# Patient Record
Sex: FEMALE | Race: WHITE | URBAN - METROPOLITAN AREA
[De-identification: names, ages, dates, MRNs, and addresses within clinical notes are randomized per-mention and may not be internally consistent; named-entity substitution may affect disease eponyms.]

---

## 2017-10-09 ENCOUNTER — HOSPITAL ENCOUNTER (OUTPATIENT)
Dept: PSYCHIATRY | Age: 22
Discharge: HOME | End: 2017-10-31
Admitting: PSYCHIATRY & NEUROLOGY
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
## 2017-10-10 ENCOUNTER — HOSPITAL ENCOUNTER (OUTPATIENT)
Dept: PSYCHIATRY | Age: 22
Discharge: HOME | End: 2017-10-10
Admitting: PSYCHIATRY & NEUROLOGY

## 2017-10-10 DIAGNOSIS — F43.10 PTSD (POST-TRAUMATIC STRESS DISORDER): ICD-10-CM

## 2017-10-10 DIAGNOSIS — F33.2 SEVERE EPISODE OF RECURRENT MAJOR DEPRESSIVE DISORDER, WITHOUT PSYCHOTIC FEATURES (HCC): ICD-10-CM

## 2017-10-10 PROCEDURE — 99219 PR INITIAL OBSERVATION CARE/DAY 50 MINUTES: CPT | Performed by: PHYSICIAN ASSISTANT

## 2017-10-10 RX ORDER — BUSPIRONE HYDROCHLORIDE 7.5 MG/1
7.5 TABLET ORAL 3 TIMES DAILY
Qty: 42 TABLET | Refills: 0 | Status: SHIPPED | OUTPATIENT
Start: 2017-10-10 | End: 2017-10-10

## 2017-10-10 RX ORDER — BUSPIRONE HYDROCHLORIDE 7.5 MG/1
7.5 TABLET ORAL 3 TIMES DAILY
Qty: 42 TABLET | Refills: 0 | Status: SHIPPED | OUTPATIENT
Start: 2017-10-10 | End: 2017-10-23

## 2017-10-10 RX ORDER — SERTRALINE HYDROCHLORIDE 100 MG/1
150 TABLET, FILM COATED ORAL DAILY
Qty: 1 TABLET | Refills: 0
Start: 2017-10-10 | End: 2017-10-17 | Stop reason: DRUGHIGH

## 2017-10-10 RX ORDER — HYDROXYZINE PAMOATE 25 MG/1
25 CAPSULE ORAL 2 TIMES DAILY PRN
Qty: 28 CAPSULE | Refills: 0 | Status: SHIPPED | OUTPATIENT
Start: 2017-10-10 | End: 2017-10-23

## 2017-10-10 ASSESSMENT — ENCOUNTER SYMPTOMS
DOUBLE VISION: 0
NAUSEA: 0
BLURRED VISION: 0
VOMITING: 0
DIARRHEA: 0
CONSTIPATION: 0
SHORTNESS OF BREATH: 0

## 2017-10-10 NOTE — PLAN OF CARE
Problem: Altered Mood, Depressive Behavior  Goal: LTG-Able to verbalize acceptance of life and situations over which he or she has no control                                                                      Group Therapy Note    Date: 10/10/2017  Start Time: 8:30  End Time:  9:45  Number of Participants: 6    Type of Group: Psychotherapy    Patient's Goal:  PT will improve interpersonal interactions through group processing and agenda setting. Notes:  Pt participated in group discussion minimally, provided supportive feedback, and addressed her agenda within the group. Status After Intervention:  Unchanged    Participation Level:  Active Listener and Minimal    Participation Quality: Appropriate, Attentive, Sharing and Supportive      Speech:  normal      Thought Process/Content: Logical      Affective Functioning: Flat      Mood: dysphoric      Level of consciousness:  Alert, Oriented x4 and Attentive      Response to Learning: Able to verbalize current knowledge/experience and Progressing to goal      Endings: None Reported    Modes of Intervention: Education, Support, Socialization, Exploration and Clarifying      Discipline Responsible: /Counselor      Signature:  Cristino Arevalo

## 2017-10-10 NOTE — PLAN OF CARE
Problem: Altered Mood, Depressive Behavior  Goal: LTG-Able to verbalize acceptance of life and situations over which he or she has no control  Outcome: Ongoing                                                                      Group Therapy Note    Date: 10/10/2017  Start Time: 1:10pm  End Time:  2:20pm  Number of Participants: 7     Type of Group: Art Therapy     Patient's Goal:  Pts were directed to engage in an art therapy directive focusing on identifying personal rights. Pts were given a list of a Personal Bill of Rights, were encouraged to choose their favorite and create a visual permission slip related to their chosen personal right. Pts were encouraged to share their drawings/writings to gain further insight into their wants and needs.      Notes:  Minnie displayed positive ability to engage in the art therapy directive and was able to share her artwork with group peers. She was able to choose \"I have the right to forgive others and myself\". Minnie was able to share her thoughts minimally but displayed positive motivation to remain present for group and engaged in treatment. Status After Intervention:  Improved    Participation Level:  Active Listener and Interactive (with prompting)    Participation Quality: Appropriate, Attentive and Sharing (with prompting)      Speech:  hesitant      Thought Process/Content: Logical      Affective Functioning: Flat      Mood: anxious and dysphoric      Level of consciousness:  Alert, Oriented x4 and Attentive      Response to Learning: Able to verbalize current knowledge/experience, Able to verbalize/acknowledge new learning, Able to retain information and Progressing to goal      Endings: None Reported    Modes of Intervention: Education, Support, Exploration, Clarifying, Problem-solving, Activity and Limit-setting      Discipline Responsible: Psychoeducational Specialist      Signature:  Cherry Martin MS, ATR-BC

## 2017-10-10 NOTE — PLAN OF CARE
Problem: Altered Mood, Depressive Behavior  Goal: STG-Knowledge of positive coping patterns  Outcome: Ongoing                                                                      Group Therapy Note    Date: 10/10/2017  Start Time: 10:00 am   End Time:  11:00 am   Number of Participants: 6    Type of Group: Psychoeducation    Wellness Binder Information  Module Name:  Tab 9 Staying Well   Session Number:  Self Care Wheel    Patient's Goal:  Pt goal was to understand the importance of self care. Pt was to identify self care in each area physical, emotional, spiritual, and social.     Notes:  Pt participated in group discussion and activity. Status After Intervention:  Unchanged    Participation Level:  Active Listener and Interactive    Participation Quality: Appropriate and Attentive      Speech:  normal      Thought Process/Content: Logical  Linear      Affective Functioning: Congruent      Mood: depressed      Level of consciousness:  Alert      Response to Learning: Able to verbalize current knowledge/experience      Endings: None Reported    Modes of Intervention: Education, Support, Socialization, Exploration, Clarifying, Problem-solving, Activity and Movement      Discipline Responsible: /Counselor      Signature:  Nicole Watkins [facility]
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
## 2017-10-11 ENCOUNTER — HOSPITAL ENCOUNTER (OUTPATIENT)
Dept: PSYCHIATRY | Age: 22
Discharge: HOME | End: 2017-10-11
Admitting: PSYCHIATRY & NEUROLOGY

## 2017-10-11 VITALS — HEART RATE: 92 BPM | SYSTOLIC BLOOD PRESSURE: 110 MMHG | DIASTOLIC BLOOD PRESSURE: 78 MMHG

## 2017-10-11 NOTE — PLAN OF CARE
Problem: Altered Mood, Depressive Behavior  Goal: LTG-Able to verbalize acceptance of life and situations over which he or she has no control  Outcome: Ongoing                                                                      Group Therapy Note    Date: 10/11/2017  Start Time: 1:10pm  End Time:  2:20pm  Number of Participants: 6    Type of Group: Art Therapy    Patient's Goal:  Pts were directed to engage in an art therapy intervention focusing on effective communication with others, problem solving skills and group cohesion. Pts were directed to work in dyads; creating a scribble drawing, identifying found images in the scribble, creating a narrative using the found images and sharing heir creative narratives with fellow group members. Notes:  Minnie was able to actively participate in the art therapy directive. She displayed positive ability to engage in interpersonal interactions, problem solving and effective communication skills while completing the task. Minnie was initially resistant to voicing her thoughts but was able to engage with her peers with prompting and encouragement. She displayed brightened affect and mood while engaging in the directive. Status After Intervention:  Improved    Participation Level:  Active Listener and Interactive    Participation Quality: Appropriate, Attentive, Sharing and Supportive      Speech:  hesitant      Thought Process/Content: Logical  Linear      Affective Functioning: brightened, increased range of affect      Mood: anxious and depressed  (decreased)    Level of consciousness:  Alert, Oriented x4 and Attentive      Response to Learning: Able to verbalize current knowledge/experience, Able to verbalize/acknowledge new learning, Able to retain information and Progressing to goal      Endings: None Reported    Modes of Intervention: Education, Support, Exploration, Clarifying, Problem-solving, Activity, Movement and Limit-setting      Discipline Responsible: Psychoeducational Specialist      Signature:  Zurdo Aguilar, MS, ATR-BC

## 2017-10-11 NOTE — PLAN OF CARE
Problem: Altered Mood, Depressive Behavior  Goal: STG-Knowledge of positive coping patterns  Outcome: Ongoing                                                                      Group Therapy Note    Date: 10/11/2017  Start Time: 10:00am  End Time:  11:00am  Number of Participants: 10     Type of Group: Cognitive Skills     Patient's Goal:  Pts were encouraged to engage in a poetry free association directive. Pts were encouraged to read a poem and free associate words or images with the poem. Pts were encouraged to share their words and images with the group. Group discussion focused on self-discovery, self trust and acceptance.      Notes:  Minnie displayed positive ability to engage in the poetry directive. She was able to share her list with the group and was able to discuss her experience of the directive with the group. Status After Intervention:  Improved    Participation Level:  Active Listener and Interactive (with prompting)    Participation Quality: Appropriate, Attentive and Sharing (with prompting)      Speech:  Hesitant, quiet      Thought Process/Content: Logical  Perseverating      Affective Functioning: Flat      Mood: anxious and depressed      Level of consciousness:  Alert, Oriented x4 and Attentive      Response to Learning: Able to verbalize current knowledge/experience, Able to verbalize/acknowledge new learning, Able to retain information and Progressing to goal      Endings: None Reported    Modes of Intervention: Education, Support, Exploration, Clarifying, Problem-solving, Activity and Limit-setting      Discipline Responsible: Psychoeducational Specialist      Signature:  Reagan Ruth MS, ATR-BC

## 2017-10-11 NOTE — PLAN OF CARE
Problem: Altered Mood, Depressive Behavior  Goal: LTG-Able to verbalize acceptance of life and situations over which he or she has no control  Outcome: Ongoing                                                                      Group Therapy Note    Date: 10/11/2017  Start Time: 8:35 am   End Time:  9:45 am   Number of Participants: 10     Type of Group: Psychotherapy     Patient's Goal:  Pt's goal was to be present. Notes:  Pt was engaged in group. Pt met her goal.      Status After Intervention:  Unchanged    Participation Level:  Active Listener and Interactive    Participation Quality: Appropriate, Attentive, Sharing and Supportive      Speech:  normal      Thought Process/Content: Logical  Linear      Affective Functioning: Blunted      Mood: depressed      Level of consciousness:  Alert, Oriented x4 and Attentive      Response to Learning: Able to verbalize current knowledge/experience, Able to verbalize/acknowledge new learning and Progressing to goal      Endings: None Reported    Modes of Intervention: Education, Support, Socialization, Exploration, Clarifying, Problem-solving, Activity and Movement      Discipline Responsible: /Counselor      Signature:  Nicole Watkins, [facility]

## 2017-10-12 ENCOUNTER — HOSPITAL ENCOUNTER (OUTPATIENT)
Dept: PSYCHIATRY | Age: 22
Discharge: HOME | End: 2017-10-12
Admitting: PSYCHIATRY & NEUROLOGY

## 2017-10-12 VITALS — SYSTOLIC BLOOD PRESSURE: 127 MMHG | DIASTOLIC BLOOD PRESSURE: 82 MMHG | HEART RATE: 105 BPM

## 2017-10-12 NOTE — PLAN OF CARE
Problem: Altered Mood, Depressive Behavior  Goal: STG-Knowledge of positive coping patterns  Outcome: Ongoing                                                                      Group Therapy Note    Date: 10/12/2017  Start Time: 1:15pm  End Time:  2:20pm  Number of Participants: 9     Type of Group: Art Therapy     Patient's Goal:  Pts were directed to engage in relaxation techniques in an effort to decrease anxiety and increase mindfulness. Pts were offered the option of completing a freeform mandala and/or a Zentangle. Pts were offered a variety of materials and were encouraged to focus on relaxing music and the repetition of patterns as positive coping skills.      Notes:  Minnie displayed positive ability to engage in the art therapy directive. She was able to share her drawing with the group and reported that she was \"working on allowing herself to have imperfections, even if she wasn't completely happy with her drawing\". Status After Intervention:  Improved    Participation Level:  Active Listener and Interactive    Participation Quality: Appropriate, Attentive, Sharing and Supportive      Speech:  hesitant      Thought Process/Content: Logical  Linear      Affective Functioning: Flat      Mood: anxious and depressed      Level of consciousness:  Alert, Oriented x4 and Attentive      Response to Learning: Able to verbalize current knowledge/experience, Able to verbalize/acknowledge new learning, Able to retain information and Progressing to goal      Endings: None Reported    Modes of Intervention: Education, Support, Exploration, Clarifying, Problem-solving, Activity and Limit-setting      Discipline Responsible: Psychoeducational Specialist      Signature:  Michael Williamson MS, ATR-BC

## 2017-10-12 NOTE — PLAN OF CARE
Problem: Altered Mood, Depressive Behavior  Goal: LTG-Able to verbalize acceptance of life and situations over which he or she has no control                                                                      Group Therapy Note    Date: 10/12/2017  Start Time: 8:30  End Time:  9:45  Number of Participants: 4    Type of Group: Psychotherapy      Patient's Goal:  Pt will improve interpersonal interactions through group processing and agenda setting. Notes:  Pt participated in group discussion, provided supportive feedback toward others, and addressed her agenda within the group. Status After Intervention:  Unchanged    Participation Level:  Active Listener and Interactive    Participation Quality: Appropriate, Attentive, Sharing and Supportive      Speech:  normal      Thought Process/Content: Logical      Affective Functioning: Flat      Mood: dysphoric      Level of consciousness:  Alert, Oriented x4 and Attentive      Response to Learning: Able to verbalize current knowledge/experience and Progressing to goal      Endings: None Reported    Modes of Intervention: Education, Support, Socialization, Exploration, Clarifying and Problem-solving      Discipline Responsible: /Counselor      Signature:  Javy Gray
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
## 2017-10-12 NOTE — PLAN OF CARE
Problem: Altered Mood, Depressive Behavior  Goal: STG-Knowledge of positive coping patterns  Outcome: Ongoing                                                                      Group Therapy Note    Date: 10/12/2017  Start Time: 11:15am  End Time: 12:15pm   Number of Participants: 6    Type of Group: Psychoeducation    Psychoeducation/ Recreation Therapy     Patient's Goal: To discuss trust building and trust reducing behaviors, while incorporating a game of Cher mariangel. Notes: Pt engaged in group activity. Pt provided and was receptive of positive feedback. Status After Intervention:  Improved    Participation Level:  Active Listener and Interactive    Participation Quality: Appropriate, Attentive, Sharing and Supportive      Speech:  normal      Thought Process/Content: Logical      Affective Functioning: Congruent      Mood: anxious and depressed      Level of consciousness:  Alert and Oriented x4      Response to Learning: Able to retain information and Capable of insight      Endings: None Reported    Modes of Intervention: Education, Support, Socialization and Activity      Discipline Responsible: Psychoeducational Specialist      Signature:  Genie Cordoba

## 2017-10-13 ENCOUNTER — HOSPITAL ENCOUNTER (OUTPATIENT)
Dept: PSYCHIATRY | Age: 22
Discharge: HOME | End: 2017-10-13
Admitting: PSYCHIATRY & NEUROLOGY

## 2017-10-13 NOTE — PLAN OF CARE
Problem: Altered Mood, Depressive Behavior  Goal: LTG-Able to verbalize acceptance of life and situations over which he or she has no control  Outcome: Ongoing                                                                      Group Therapy Note    Date: 10/13/2017  Start Time: 0845  End Time:  0945  Number of Participants: 5    Type of Group: Psychotherapy       Patient's Goal:  To improve interpersonal interactions through group processing and agenda setting     Notes:  Pt actively participated in the group discussion and was able to give feedback to other group members    Status After Intervention:  Improved    Participation Level:  Active Listener and Interactive    Participation Quality: Appropriate, Attentive, Sharing and Supportive      Speech:  normal      Thought Process/Content: Logical      Affective Functioning: Flat      Mood: depressed      Level of consciousness:  Alert, Oriented x4 and Attentive      Response to Learning: Able to verbalize current knowledge/experience and Progressing to goal      Endings: None Reported    Modes of Intervention: Education, Support, Socialization, Exploration and Clarifying      Discipline Responsible: /Counselor      Signature:  STEPHENIE Fowler

## 2017-10-13 NOTE — PLAN OF CARE
Problem: Altered Mood, Depressive Behavior  Goal: LTG-Able to verbalize acceptance of life and situations over which he or she has no control  Outcome: Ongoing                                                                      Group Therapy Note    Date: 10/13/2017  Start Time: 1115  End Time:  1215  Number of Participants: 5    Type of Group: Relapse Prevention    Wellness Binder Information  Module Name:  Staying well  Session Number:  Tab 9    Patient's Goal:  To identify healthy ways to take care of ourselves to help prevent relapse prevention    Notes:  Pt actively participated in group discussion and was able to identify healthy ways of taking care of self    Status After Intervention:  Improved    Participation Level:  Active Listener and Interactive    Participation Quality: Appropriate, Attentive, Sharing and Supportive      Speech:  normal      Thought Process/Content: Logical      Affective Functioning: Flat      Mood: depressed      Level of consciousness:  Alert, Oriented x4 and Attentive      Response to Learning: Able to verbalize current knowledge/experience and Progressing to goal      Endings: None Reported    Modes of Intervention: Education, Support, Socialization and Exploration      Discipline Responsible: /Counselor      Signature:  STEPHENIE Harris

## 2017-10-16 ENCOUNTER — HOSPITAL ENCOUNTER (OUTPATIENT)
Dept: PSYCHIATRY | Age: 22
Discharge: HOME | End: 2017-10-16
Admitting: PSYCHIATRY & NEUROLOGY

## 2017-10-16 VITALS — DIASTOLIC BLOOD PRESSURE: 75 MMHG | SYSTOLIC BLOOD PRESSURE: 121 MMHG | HEART RATE: 84 BPM

## 2017-10-16 NOTE — PLAN OF CARE
Problem: Altered Mood, Depressive Behavior  Goal: LTG-Able to verbalize acceptance of life and situations over which he or she has no control  Outcome: Ongoing                                                                      Group Therapy Note    Date: 10/16/2017  Start Time: 1:15pm  End Time:  2:20pm  Number of Participants: 5    Type of Group: Art Therapy     Patient's Goal:  Pts were directed to engage in creating a piece of response art to music. Pts were offered song lyrics to the song \"Giants\" and were encouraged to create an independent piece of artwork in response to the song. Pts were encouraged to share their drawings with fellow group members; reflecting on their thoughts and feelings as related to the song. Notes:  Minnie displayed positive ability to engage in the art therapy directive. She declined to verbally share her artwork with peers but was able to request assistance from her peers with managing traumatic responses in future groups. Minnie stated that saying her name repeatedly and having someone offer her a hug is helpful in grounding her when experiencing difficulty in group. Status After Intervention:  Improved    Participation Level:  Active Listener and Interactive    Participation Quality: Appropriate, Attentive and Sharing (with prompting)      Speech:  hesitant      Thought Process/Content: Logical  Perseverating      Affective Functioning: Flat      Mood: anxious      Level of consciousness:  Alert, Oriented x4 and Attentive      Response to Learning: Able to verbalize current knowledge/experience, Able to verbalize/acknowledge new learning, Able to retain information and Progressing to goal      Endings: None Reported    Modes of Intervention: Education, Support, Exploration, Clarifying, Problem-solving, Activity and Limit-setting      Discipline Responsible: Psychoeducational Specialist      Signature:  Claude Dublin, MS, ATR-BC

## 2017-10-16 NOTE — PLAN OF CARE
Problem: Altered Mood, Depressive Behavior  Goal: LTG-Able to verbalize acceptance of life and situations over which he or she has no control                                                                      Group Therapy Note    Date: 10/16/2017  Start Time: 8:30  End Time:  9:45  Number of Participants: 8    Type of Group: Psychotherapy      Patient's Goal:  PT will improve interpersonal interactions through group processing and agenda setting. Notes:  PT participated in group discussion, provided supportive feedback toward others, and addressed her agenda within the group. Status After Intervention:  Unchanged    Participation Level:  Active Listener and Interactive    Participation Quality: Appropriate, Attentive, Sharing and Supportive      Speech:  normal      Thought Process/Content: Logical      Affective Functioning: Flat      Mood: dysphoric      Level of consciousness:  Alert, Oriented x4 and Attentive      Response to Learning: Able to verbalize current knowledge/experience and Progressing to goal      Endings: None Reported    Modes of Intervention: Education, Support, Socialization, Exploration, Clarifying and Problem-solving      Discipline Responsible: /Counselor      Signature:  Cristino Arevalo

## 2017-10-17 ENCOUNTER — HOSPITAL ENCOUNTER (OUTPATIENT)
Dept: PSYCHIATRY | Age: 22
Discharge: HOME | End: 2017-10-17
Admitting: PSYCHIATRY & NEUROLOGY

## 2017-10-17 PROCEDURE — 99226 PR SBSQ OBSERVATION CARE/DAY 35 MINUTES: CPT | Performed by: PHYSICIAN ASSISTANT

## 2017-10-17 RX ORDER — SERTRALINE HYDROCHLORIDE 100 MG/1
150 TABLET, FILM COATED ORAL DAILY
Qty: 45 TABLET | Refills: 0 | Status: SHIPPED | OUTPATIENT
Start: 2017-10-17 | End: 2017-11-09

## 2017-10-17 RX ORDER — MIRTAZAPINE 15 MG/1
15 TABLET, FILM COATED ORAL NIGHTLY
Qty: 30 TABLET | Refills: 0 | Status: SHIPPED | OUTPATIENT
Start: 2017-10-17

## 2017-10-17 ASSESSMENT — ENCOUNTER SYMPTOMS
DOUBLE VISION: 0
BLURRED VISION: 0
DIARRHEA: 0
CONSTIPATION: 0
VOMITING: 0
NAUSEA: 0

## 2017-10-17 NOTE — PLAN OF CARE
Problem: Altered Mood, Depressive Behavior  Goal: LTG-Able to verbalize and/or display a decrease in depressive symptoms  Outcome: Ongoing                                                                      Group Therapy Note    Date: 10/17/2017  Start Time: 8:30 am   End Time:  9:45 am   Number of Participants: 8     Type of Group: Psychotherapy     Patient's Goal:  Pt's goal was to share how she feels and give feedback. Notes:  Pt was engaged in group. Pt shared how she felt when therapist said something. Pt and therapist were able to process her feeling and come to an understanding. Pt met her goal.     Status After Intervention:  Unchanged    Participation Level:  Active Listener and Interactive    Participation Quality: Appropriate, Attentive, Sharing and Supportive      Speech:  normal      Thought Process/Content: Logical  Linear      Affective Functioning: Congruent      Mood: anxious      Level of consciousness:  Alert, Oriented x4 and Attentive      Response to Learning: Able to verbalize current knowledge/experience, Able to verbalize/acknowledge new learning, Able to retain information, Capable of insight, Able to change behavior and Progressing to goal      Endings: None Reported    Modes of Intervention: Education, Support, Socialization, Exploration, Clarifying, Problem-solving, Activity and Movement      Discipline Responsible: /Counselor      Signature:  Wilmer Parnell 52

## 2017-10-17 NOTE — PLAN OF CARE
Problem: Altered Mood, Depressive Behavior  Goal: STG-Knowledge of positive coping patterns  Outcome: Ongoing                                                                      Group Therapy Note    Date: 10/17/2017  Start Time: 10:00am  End Time: 11:00am  Number of Participants: 7    Type of Group: Cognitive Skills    Patient's Goal:  Pts engaged in a group focusing on mindfulness and using aromatherapy and guided imagery as positive coping skill techniques. Pts were encouraged to experiment with a variety of oils, choosing their favorite and utilizing it as a grounding tool during the guided meditation process. Pts were offered handouts on essential oil benefits and a variety of grounding guided meditation scripts at the end of group. Notes:  Minnie displayed positive engagement and interest in the group topic. She was able to actively engage in the aromatherapy exercise and the guided meditation process. Minnie offered some input into the group discussion and her affect appeared brightened and her mood more upbeat as compared to the previous day. She accepted the handouts at the end of group. Status After Intervention:  Improved    Participation Level:  Active Listener and Interactive    Participation Quality: Appropriate, Attentive and Sharing      Speech:  normal      Thought Process/Content: Logical  Linear      Affective Functioning: Constricted/Restricted      Mood: anxious and depressed (decreased)      Level of consciousness:  Alert, Oriented x4 and Attentive      Response to Learning: Able to verbalize current knowledge/experience, Able to verbalize/acknowledge new learning, Able to retain information and Progressing to goal      Endings: None Reported    Modes of Intervention: Education, Support, Exploration, Clarifying, Problem-solving, Activity and Limit-setting      Discipline Responsible: Psychoeducational Specialist      Signature:  Dg Winchester MS, ATR-BC

## 2017-10-17 NOTE — PROGRESS NOTES
Types: Cigarettes    Smokeless tobacco: Never Used    Alcohol use 2.4 oz/week     3 Cans of beer, 1 Shots of liquor per week      Comment: occassionally    Drug use:      Types: Marijuana      Comment: yesterday    Sexual activity: Yes     Partners: Male     Other Topics Concern    Not on file     Social History Narrative    No narrative on file         Review of Systems   Constitutional: Negative for chills and fever. Eyes: Negative for blurred vision and double vision. Cardiovascular: Negative for palpitations. Gastrointestinal: Negative for constipation, diarrhea, nausea and vomiting. Skin: Negative for rash. Neurological: Negative for dizziness, seizures and headaches. Psychiatric/Behavioral: Positive for depression. Negative for hallucinations and suicidal ideas. The patient is nervous/anxious and has insomnia. Reviewed VS, Reviewed pertinent labs, No acute distress, Respirations: no distress noted, Neuro: No abnormalities evident    Objective: There were no vitals filed for this visit.     Recent Results (from the past 336 hour(s))   CBC auto differential    Collection Time: 10/07/17  8:31 PM   Result Value Ref Range    WBC 7.4 4.0 - 11.0 K/uL    RBC 4.50 4.00 - 5.20 M/uL    Hemoglobin 13.9 12.0 - 16.0 g/dL    Hematocrit 41.6 36.0 - 48.0 %    MCV 92.4 80.0 - 100.0 fL    MCH 30.9 26.0 - 34.0 pg    MCHC 33.4 31.0 - 36.0 g/dL    RDW 12.7 12.4 - 15.4 %    Platelets 149 526 - 846 K/uL    MPV 9.1 5.0 - 10.5 fL    Neutrophils % 47.6 %    Lymphocytes % 42.6 %    Monocytes % 8.2 %    Eosinophils % 0.9 %    Basophils % 0.7 %    Neutrophils # 3.5 1.7 - 7.7 K/uL    Lymphocytes # 3.2 1.0 - 5.1 K/uL    Monocytes # 0.6 0.0 - 1.3 K/uL    Eosinophils # 0.1 0.0 - 0.6 K/uL    Basophils # 0.1 0.0 - 0.2 K/uL   Comprehensive metabolic panel    Collection Time: 10/07/17  8:31 PM   Result Value Ref Range    Sodium 137 136 - 145 mmol/L    Potassium 3.9 3.5 - 5.1 mmol/L    Chloride 102 99 - 110 mmol/L    CO2 capsule by mouth 2 times daily as needed for Anxiety 28 capsule 0    busPIRone (BUSPAR) 7.5 MG tablet Take 1 tablet by mouth 3 times daily for 14 days 42 tablet 0    Norethin Ace-Eth Estrad-FE (MINASTRIN 24 FE PO) Take by mouth      naproxen (NAPROSYN) 500 MG tablet Take 1 tablet by mouth 2 times daily (with meals) 30 tablet 0     No current facility-administered medications for this encounter. Plan:   1. Begin Remeron 15 mg nightly for sleep  2. Continue current medications  3. Continue PHP Stepdown to IOP Tues, Thurs, Friday half days on 10/24/17   4. Scripts given to the patient  5. Follow-up as needed.       Nitin Low PA-C  10/17/17

## 2017-10-18 ENCOUNTER — HOSPITAL ENCOUNTER (OUTPATIENT)
Dept: PSYCHIATRY | Age: 22
Discharge: HOME | End: 2017-10-18
Admitting: PSYCHIATRY & NEUROLOGY

## 2017-10-18 VITALS — SYSTOLIC BLOOD PRESSURE: 117 MMHG | DIASTOLIC BLOOD PRESSURE: 73 MMHG | HEART RATE: 86 BPM

## 2017-10-18 NOTE — BH NOTE
The treatment team met to discuss Pt. Pt will be stepping down to IOP October 24, 2017. Pt is doing well.      STEPHENIE Mosley

## 2017-10-18 NOTE — PLAN OF CARE
Problem: Altered Mood, Depressive Behavior  Goal: LTG-Able to verbalize acceptance of life and situations over which he or she has no control                                                                      Group Therapy Note    Date: 10/18/2017  Start Time: 8:30  End Time:  9:45  Number of Participants: 7    Type of Group: Psychotherapy      Patient's Goal:  Pt will improve interpersonal interactions through group processing and agenda setting. Notes:  Pt participated in group discussion, provided supportive feedback toward others, and addressed her agenda within the group. Status After Intervention:  Unchanged    Participation Level:  Active Listener and Interactive    Participation Quality: Appropriate, Attentive, Sharing and Supportive      Speech:  normal      Thought Process/Content: Logical      Affective Functioning: Flat      Mood: dysphoric      Level of consciousness:  Alert, Oriented x4 and Attentive      Response to Learning: Able to verbalize current knowledge/experience and Progressing to goal      Endings: None Reported    Modes of Intervention: Education, Support, Socialization, Exploration, Clarifying and Problem-solving      Discipline Responsible: /Counselor      Signature:  Madeline Saravia

## 2017-10-19 ENCOUNTER — HOSPITAL ENCOUNTER (OUTPATIENT)
Dept: PSYCHIATRY | Age: 22
Discharge: HOME | End: 2017-10-19
Admitting: PSYCHIATRY & NEUROLOGY

## 2017-10-19 VITALS — HEART RATE: 93 BPM | SYSTOLIC BLOOD PRESSURE: 118 MMHG | DIASTOLIC BLOOD PRESSURE: 75 MMHG

## 2017-10-19 NOTE — PLAN OF CARE
Problem: Altered Mood, Depressive Behavior  Goal: LTG-Able to verbalize acceptance of life and situations over which he or she has no control  Outcome: Ongoing                                                                      Group Therapy Note    Date: 10/19/2017  Start Time: 10:00am  End Time:  11:00am  Number of Participants: 10    Type of Group: Cognitive Skills    Patient's Goal: Pts were directed to engage in completing daily intention trackers. Pts were encouraged to identify and complete the statement: Today I will be, feel, do, appreciate, let go of and attract. Pts engaged in a group discussion regarding the topics. Notes:  Minnie displayed positive ability to engage in the directive. She displayed some preoccupation with needing a PRN to assist her in managing anxiety but was able to remain present and moderately focused for the duration of group. Status After Intervention:  Improved    Participation Level:  Active Listener and Interactive (with prompting)    Participation Quality: Appropriate, Attentive, Sharing and Supportive (with prompting)      Speech:  hesitant      Thought Process/Content: Logical      Affective Functioning: Blunted      Mood: anxious and depressed      Level of consciousness:  Alert, Oriented x4 and Preoccupied      Response to Learning: Able to verbalize current knowledge/experience, Able to verbalize/acknowledge new learning, Able to retain information and Progressing to goal      Endings: None Reported    Modes of Intervention: Education, Support, Exploration, Clarifying, Problem-solving, Activity and Limit-setting      Discipline Responsible: Psychoeducational Specialist      Signature:  Michael Williamson MS, ATR-BC

## 2017-10-20 ENCOUNTER — HOSPITAL ENCOUNTER (OUTPATIENT)
Dept: PSYCHIATRY | Age: 22
Discharge: HOME | End: 2017-10-20
Admitting: PSYCHIATRY & NEUROLOGY

## 2017-10-20 VITALS — SYSTOLIC BLOOD PRESSURE: 142 MMHG | HEART RATE: 81 BPM | DIASTOLIC BLOOD PRESSURE: 78 MMHG

## 2017-10-20 NOTE — PLAN OF CARE
Problem: Altered Mood, Depressive Behavior  Goal: LTG-Able to verbalize acceptance of life and situations over which he or she has no control  Outcome: Ongoing                                                                      Group Therapy Note    Date: 10/20/2017  Start Time: 1:10pm  End Time:  2:15pm  Number of Participants: 8    Type of Group: Art Therapy    Patient's Goal: Pts were directed to continue engaging in an art therapy group directive. Pts were encouraged to establish their goals for the group and to work to complete their goals within the allotted time of the group. Pts chose to focus on establishing individual roles of group members (on the [address] [place] [address]), building [address] [facility] and organizing their supplies. Pts also chose to focus on communication, working together as a group and trusting fellow group members. Notes:  Minnie displayed positive effort to engage in the group process. She was able to offer feedback to peers and became frustrated when fellow group members were not as invested in the group process as she was. Minnie was able to display increased insight. At the end of group she became frustrated and withdrawn and responded with tearfulness. Status After Intervention:  Improved    Participation Level:  Active Listener and Interactive    Participation Quality: Appropriate, Attentive, Sharing and Supportive      Speech:  normal      Thought Process/Content: Logical  Linear      Affective Functioning: Congruent      Mood: anxious and depressed (decreased)      Level of consciousness:  Alert, Oriented x4 and Attentive      Response to Learning: Able to verbalize current knowledge/experience, Able to verbalize/acknowledge new learning, Able to retain information, Capable of insight and Progressing to goal      Endings: None Reported    Modes of Intervention: Education, Support, Exploration, Clarifying, Problem-solving, Activity, Confrontation and Limit-setting      Discipline Responsible: Psychoeducational Specialist      Signature:  Dotty Closs, MS, ATR-BC

## 2017-10-20 NOTE — PLAN OF CARE
Problem: Altered Mood, Depressive Behavior  Goal: STG-Knowledge of positive coping patterns  Outcome: Ongoing                                                                      Group Therapy Note    Date: 10/20/2017  Start Time: 10:00am  End Time: 11:00am  Number of Participants: 7    Type of Group: Psychoeducation    Psychoeducation/ Recreation Therapy     Patient's Goal: To discuss the 7 habits of happy people and ways to improve each area. Notes:  Pt engaged in group discussion. Pt was appropriate and created a booklet of the 7 habits. Status After Intervention:  Improved    Participation Level:  Active Listener and Interactive    Participation Quality: Appropriate, Attentive, Sharing and Supportive      Speech:  normal      Thought Process/Content: Logical      Affective Functioning: Flat      Mood: depressed      Level of consciousness:  Alert and Oriented x4      Response to Learning: Able to retain information and Capable of insight      Endings: None Reported    Modes of Intervention: Education, Support, Socialization and Activity      Discipline Responsible: Psychoeducational Specialist       Signature:  Derik Harrell

## 2017-10-23 ENCOUNTER — HOSPITAL ENCOUNTER (OUTPATIENT)
Dept: PSYCHIATRY | Age: 22
Discharge: HOME | End: 2017-10-23
Admitting: PSYCHIATRY & NEUROLOGY

## 2017-10-23 RX ORDER — HYDROXYZINE PAMOATE 25 MG/1
25 CAPSULE ORAL 2 TIMES DAILY PRN
Qty: 60 CAPSULE | Refills: 1 | Status: SHIPPED | OUTPATIENT
Start: 2017-10-23 | End: 2017-11-22

## 2017-10-23 RX ORDER — BUSPIRONE HYDROCHLORIDE 7.5 MG/1
7.5 TABLET ORAL 3 TIMES DAILY
Qty: 90 TABLET | Refills: 1 | Status: SHIPPED | OUTPATIENT
Start: 2017-10-23 | End: 2017-11-09

## 2017-10-23 NOTE — PLAN OF CARE
Problem: Altered Mood, Depressive Behavior  Goal: STG-Knowledge of positive coping patterns  Outcome: Ongoing                                                                      Group Therapy Note    Date: 10/23/2017  Start Time: 1:15pm  End Time:  2:20pm  Number of Participants: 4    Type of Group: Art Therapy     Patient's Goal:  Pts were directed to engage in an open studio art therapy group, in an effort to manage their emotions felt throughout the day and use creative self-expression as a positive coping skill. Notes:  Minnie chose to create an independent piece of artwork while present in group. She created a non-objective colorful scribble drawing using markers; tearing up the drawing and creating a colorful fall tree, using the torn pieces to compose the leaves of the drawing. Minnie displayed limited insight into her decision making and seemed focused on attempting to take responsibility for others actions (both in an out of group). Status After Intervention:  Improved    Participation Level:  Active Listener and Interactive    Participation Quality: Appropriate, Attentive and Sharing      Speech:  hesitant      Thought Process/Content: Logical  Perseverating      Affective Functioning: Flat      Mood: anxious and dysphoric      Level of consciousness:  Alert, Oriented x4 and Attentive      Response to Learning: Able to verbalize current knowledge/experience, Able to retain information and Progressing to goal      Endings: None Reported    Modes of Intervention: Education, Support, Exploration, Clarifying, Problem-solving, Activity and Limit-setting      Discipline Responsible: Psychoeducational Specialist      Signature:  Destiny Alcaraz MS, ATR-BC

## 2017-10-23 NOTE — PLAN OF CARE
Problem: Altered Mood, Depressive Behavior  Goal: LTG-Able to verbalize acceptance of life and situations over which he or she has no control  Outcome: Ongoing                                                                      Group Therapy Note    Date: 10/23/2017  Start Time: 10:00am  End Time:  11:15am               Number of Participants: 5     Type of Group: Cognitive Skills/Art Therapy      Patient's Goal:  Pts were directed to engage in an art therapy directive; creating a small depiction of the current emotion they are experiencing and discussing it with the group. Pts were encouraged to create a second piece of art, in an effort to self-sooth and validate their current emotion. Pts were offered handouts on how to honor and self-soothe emotions and how to compartmentalize emotions to remain in a healthier state.      Notes:  Minnie displayed moderate ability to engage in the art therapy directive. She was limited in her engagement in the group discussion. She was able to identify her current emotion with some assistance from a handout and was able to create a depiction of her emotion. Minnei shared her artwork with fellow group members but was limited in her ability to manage her current emotion and/or accept strategies on how to manage her emotions. Minnie was able to assist her fellow group member with managing a panic attack while present in group.      Status After Intervention:  Unchanged    Participation Level: Interactive and Minimal    Participation Quality: Appropriate, Attentive and Resistant      Speech:  hesitant      Thought Process/Content: Logical  Perseverating      Affective Functioning: Flat      Mood: dysphoric      Level of consciousness:  Alert, Oriented x4 and Attentive      Response to Learning: Able to verbalize current knowledge/experience, Able to retain information and Progressing to goal      Endings: None Reported    Modes of Intervention: Education, Support, Exploration, Clarifying, Problem-solving, Activity and Limit-setting      Discipline Responsible: Psychoeducational Specialist      Signature:  Reynold Andrea MS, ATR-BC

## 2017-10-23 NOTE — BH NOTE
Patient indicated on her check-in sheet that she had suicidal thoughts. She states that \"it started after art therapy\" . She states that there was an argument in group and things were \" chaotic\". She states that she was taking a project seriously and wanted to apply it to real life and others were looking at it as \" just a project\". She states that after that \" Ty , made it clear how he feels about me hanging out and seeing lien\". She states that she was trying to find a ride home but was having a lot of difficulty. She states that her friends boyfriend \" wanted her gone but her friend is supportive and wants her to stay in her life. She states that her friend told her that she could just pick her up and they could go somewhere else to talk. Patient states that those were her main two stressors. She states that she had the suicidal thoughts that they lasted \"all day\". She states that she had methods to hurt herself, but denies having a plan or intent to kill herself. She states that she had thoughts of stabbing her hand and running her car off the rode. She states instead that she went home and went to a friends in [facility]. She states that she was not alone. She denies current SI. She states that she has support through her boyfriend mesfin and her friend lien. She states that she has not yet brought herself to go to her parents.

## 2017-10-23 NOTE — PLAN OF CARE
Problem: Altered Mood, Depressive Behavior  Goal: STG-Knowledge of positive coping patterns  Outcome: Ongoing                                                                      Group Therapy Note    Date: 10/23/2017  Start Time: 11:25 am   End Time:  12:15 pm   Number of Participants: 5    Type of Group: Psychoeducation    Wellness Binder Information  Module Name:  Tab 4 Social Wellness  Session Number: Get to know your group members     Patient's Goal:  Pt's goal was to pair up with another group member and ask each other questions to get to know each other. Notes:  Pt participated in group activity and discussion. Pt met her goal.     Status After Intervention:  Improved    Participation Level:  Active Listener and Interactive    Participation Quality: Appropriate, Attentive, Sharing and Supportive      Speech:  normal      Thought Process/Content: Logical  Linear      Affective Functioning: Congruent      Mood: anxious and depressed      Level of consciousness:  Alert, Oriented x4 and Attentive      Response to Learning: Able to verbalize current knowledge/experience, Able to verbalize/acknowledge new learning, Able to retain information, Capable of insight, Able to change behavior and Progressing to goal      Endings: None Reported    Modes of Intervention: Education, Support, Socialization, Exploration, Clarifying, Problem-solving, Activity and Movement      Discipline Responsible: /Counselor      Signature:  Nicole Watkins, [facility]

## 2017-10-24 ENCOUNTER — HOSPITAL ENCOUNTER (OUTPATIENT)
Dept: PSYCHIATRY | Age: 22
Discharge: HOME | End: 2017-10-24
Admitting: PSYCHIATRY & NEUROLOGY

## 2017-10-26 ENCOUNTER — HOSPITAL ENCOUNTER (OUTPATIENT)
Dept: PSYCHIATRY | Age: 22
Discharge: HOME | End: 2017-10-26
Admitting: PSYCHIATRY & NEUROLOGY

## 2017-10-26 NOTE — PLAN OF CARE
Problem: Altered Mood, Depressive Behavior  Goal: STG-Knowledge of positive coping patterns  Outcome: Ongoing                                                                      Group Therapy Note    Date: 10/26/2017  Start Time: 10:00am  End Time:  11:00am  Number of Participants: 10    Type of Group: Psychoeducation    Psychoeducation/ recreation Therapy     Patient's Goal: To complete \"Dear Man\" worksheet, identifying a strategy to set boundaries, say no, or state a need. Notes:  Pt engaged in group discussion. Pt wrote a way to set boundaries with her roommate. Status After Intervention:  Improved    Participation Level:  Active Listener and Interactive    Participation Quality: Appropriate, Attentive, Sharing and Supportive      Speech:  normal      Thought Process/Content: Logical      Affective Functioning: Flat      Mood: depressed      Level of consciousness:  Alert and Oriented x4      Response to Learning: Able to retain information and Capable of insight      Endings: None Reported    Modes of Intervention: Education, Support, Socialization and Activity      Discipline Responsible: Psychoeducational Specialist      Signature:  Issa Go

## 2017-10-26 NOTE — PLAN OF CARE
Problem: Altered Mood, Depressive Behavior  Goal: LTG-Able to verbalize acceptance of life and situations over which he or she has no control                                                                      Group Therapy Note    Date: 10/26/2017  Start Time: 8:30  End Time:  9:45  Number of Participants: 9    Type of Group: Psychotherapy      Patient's Goal:  Pt will improve interpersonal interactions through group processing and agenda setting. Notes:  Pt participated in group discussion, provided supportive feedback toward others, marilin addressed her agenda within the group. Status After Intervention:  Unchanged    Participation Level:  Active Listener and Interactive    Participation Quality: Appropriate, Attentive, Sharing and Supportive      Speech:  normal      Thought Process/Content: Logical      Affective Functioning: Flat      Mood: dysphoric      Level of consciousness:  Alert, Oriented x4 and Attentive      Response to Learning: Able to verbalize current knowledge/experience and Progressing to goal      Endings: None Reported    Modes of Intervention: Education, Support, Socialization, Exploration, Clarifying and Problem-solving      Discipline Responsible: /Counselor      Signature:  Vikas Houser

## 2017-10-26 NOTE — PLAN OF CARE
Problem: Altered Mood, Depressive Behavior  Goal: LTG-Able to verbalize acceptance of life and situations over which he or she has no control                                                                      Group Therapy Note    Date: 10/26/2017  Start Time: 11:15  End Time:  12:!5  Number of Participants: 10    Type of Group: Relapse Prevention    Wellness Binder Information  Module Name:  Mental health wellness  Session Number:  Tab 5    Patient's Goal:  PT will identify negative core beliefs, the effects that this has on their personality and relationships and how to change them. Notes:  PT participated in group discussion, was able to relate to group subject, and remained engaged for the entire duration of group. Status After Intervention:  Unchanged    Participation Level:  Active Listener and Interactive    Participation Quality: Appropriate, Attentive, Sharing and Supportive      Speech:  normal      Thought Process/Content: Logical      Affective Functioning: Flat      Mood: dysphoric      Level of consciousness:  Alert, Oriented x4 and Attentive      Response to Learning: Able to verbalize current knowledge/experience and Progressing to goal      Endings: None Reported    Modes of Intervention: Education, Support, Socialization, Exploration, Clarifying and Problem-solving      Discipline Responsible: /Counselor      Signature:  Jocelin Epperson

## 2017-10-27 ENCOUNTER — HOSPITAL ENCOUNTER (OUTPATIENT)
Dept: PSYCHIATRY | Age: 22
Discharge: HOME | End: 2017-10-27
Admitting: PSYCHIATRY & NEUROLOGY

## 2017-10-27 NOTE — BH NOTE
Patient observed in the community area tearful, this writer spoke with her and she stated that she was told \" someone \". She states that a friend passed and that they believed that she overdosed. Patient was tearful and stated \" I was supposed to go see her, I should have gone to see her\". She states that this is the girlfriend of her boyfriends brother her overdosed this time last year. Patient encouraged to talk with the group for support , she returned to group.

## 2017-10-27 NOTE — PLAN OF CARE
Problem: Altered Mood, Depressive Behavior  Goal: STG-Knowledge of positive coping patterns  Outcome: Ongoing                                                                      Group Therapy Note    Date: 10/27/2017  Start Time: 11:15 am   End Time:  12:15 pm   Number of Participants: 6    Type of Group: Psychoeducation    Wellness Binder Information  Module Name:  Tab 2 Emotional Wellness  Session Number:  Self-Esteem positive qualities    Patient's Goal:  Pt's goal was to identify positive qualities in self, then write positive qualities and examples that support the positive qualities and then journaled about the day and what Pt did and what positive qualities where shown throughout the day. Notes:  Pt participated in group discussion but not activity. Pt received a phone call at the start of group and left group. When Pt returned to group Pt shared that she just found out a friend over dosed and . Pt shared how she felt and group gave her support. Pt was not able to focus on group activity. Status After Intervention:  Improved    Participation Level:  Active Listener and Interactive    Participation Quality: Appropriate, Attentive and Sharing      Speech:  normal      Thought Process/Content: Logical  Linear      Affective Functioning: Congruent      Mood: depressed      Level of consciousness:  Alert, Oriented x4, Attentive, Preoccupied and Inattentive      Response to Learning: Able to verbalize current knowledge/experience      Endings: None Reported    Modes of Intervention: Education, Support, Socialization, Exploration, Clarifying, Problem-solving, Activity and Movement      Discipline Responsible: /Counselor      Signature:  Nicole Watkins, [facility]

## 2017-10-31 ENCOUNTER — HOSPITAL ENCOUNTER (OUTPATIENT)
Dept: PSYCHIATRY | Age: 22
Discharge: HOME | End: 2017-10-31
Admitting: PSYCHIATRY & NEUROLOGY

## 2017-10-31 NOTE — PLAN OF CARE
Problem: Altered Mood, Depressive Behavior  Goal: LTG-Able to verbalize acceptance of life and situations over which he or she has no control  Outcome: Ongoing                                                                      Group Therapy Note    Date: 10/31/2017  Start Time: 8:45 am   End Time:  9:45 am   Number of Participants: 6    Type of Group: Psychotherapy    Patient's Goal:  Pt's goal was to be present. Notes:  Pt was engaged in group. Pt met her goal.     Status After Intervention:  Improved    Participation Level:  Active Listener and Interactive    Participation Quality: Appropriate, Attentive and Sharing      Speech:  normal      Thought Process/Content: Logical  Linear      Affective Functioning: Congruent      Mood: euthymic      Level of consciousness:  Alert, Oriented x4 and Attentive      Response to Learning: Able to verbalize current knowledge/experience, Able to verbalize/acknowledge new learning, Able to retain information and Progressing to goal      Endings: None Reported    Modes of Intervention: Education, Support, Socialization, Exploration, Clarifying, Problem-solving, Activity and Movement      Discipline Responsible: /Counselor      Signature:  Nicole Watkins, [facility]

## 2017-10-31 NOTE — PLAN OF CARE
Problem: Altered Mood, Depressive Behavior  Goal: STG-Knowledge of positive coping patterns  Outcome: Ongoing                                                                      Group Therapy Note    Date: 10/31/2017  Start Time: 11:15am  End Time:  12:15pm  Number of Participants: 11    Type of Group: Psychoeducation    Psychoeducation/ Recreation Therapy     Patient's Goal:  To finish watching the movie, \"Inside Out\", and discuss related worksheet. Notes: Pt continued watching movie. Pt engaged in group discussion and is aware of strategies to identify emotions and cope.      Status After Intervention:  Improved    Participation Level: Minimal    Participation Quality: Appropriate and Attentive      Speech:  normal      Thought Process/Content: Logical      Affective Functioning: Congruent      Mood: depressed      Level of consciousness:  Alert and Oriented x4      Response to Learning: Able to retain information and Capable of insight      Endings: None Reported    Modes of Intervention: Education, Support, Socialization and Activity      Discipline Responsible: Psychoeducational Specialist      Signature:  Zonia Covarrubias

## 2017-10-31 NOTE — PLAN OF CARE
Problem: Altered Mood, Depressive Behavior  Goal: LTG-Able to verbalize and/or display a decrease in depressive symptoms  Outcome: Ongoing                                                                      Group Therapy Note    Date: 10/31/2017  Start Time: 10:00am  End Time: 11:00am  Number of Participants: 6    Type of Group: Psychoeducation    Psychoeducation/ Recreation Therapy     Patient's Goal: To watch the movie, \"Inside Out\", and discuss the concept in relation to emotions. Notes:  Pt watched the movie. Pt provided appropriate feedback and appeared as if she comprehended the concept.      Status After Intervention:  Improved    Participation Level: Minimal    Participation Quality: Appropriate      Speech:  normal      Thought Process/Content: Linear      Affective Functioning: Flat      Mood: depressed      Level of consciousness:  Alert and Attentive      Response to Learning: Able to retain information and Capable of insight      Endings: None Reported    Modes of Intervention: Education, Support, Socialization and Activity      Discipline Responsible: Psychoeducational Specialist      Signature:  Davon Ybarra, [address]

## 2017-11-01 ENCOUNTER — HOSPITAL ENCOUNTER (OUTPATIENT)
Dept: PSYCHIATRY | Age: 22
Discharge: HOME | End: 2017-11-30
Attending: PSYCHIATRY & NEUROLOGY | Admitting: PSYCHIATRY & NEUROLOGY

## 2017-11-02 ENCOUNTER — HOSPITAL ENCOUNTER (OUTPATIENT)
Dept: PSYCHIATRY | Age: 22
Discharge: HOME | End: 2017-11-02
Admitting: PSYCHIATRY & NEUROLOGY

## 2017-11-03 ENCOUNTER — HOSPITAL ENCOUNTER (OUTPATIENT)
Dept: PSYCHIATRY | Age: 22
Discharge: HOME | End: 2017-11-03
Admitting: PSYCHIATRY & NEUROLOGY

## 2017-11-03 NOTE — PLAN OF CARE
Problem: Altered Mood, Depressive Behavior  Goal: STG-Knowledge of positive coping patterns  Outcome: Ongoing                                                                      Group Therapy Note    Date: 11/3/2017  Start Time: 11:15 am   End Time:  12:15 pm   Number of Participants: 8    Type of Group: Psychoeducation    Wellness Binder Information  Module Name:  Tab 2 Emotional Wellness   Session Number:  Stages of Grief and activities to help with the grieving process. Patient's Goal:  Pt's goal was to learn the stage of grief and activities that can help with the grieving process. Notes:  Pt participated in group discussion. Pt met goal.  Pt shared that she had her friend's  on Wednesday and she feels guilty about not seeing her friend before she .      Status After Intervention:  Decompensated    Participation Level: Minimal    Participation Quality: Resistant      Speech:  hesitant      Thought Process/Content: Perseverating      Affective Functioning: Blunted, Flat and Constricted/Restricted      Mood: dysphoric      Level of consciousness:  Preoccupied and Inattentive      Response to Learning: Resistant      Endings: None Reported    Modes of Intervention: Education, Support, Socialization, Exploration, Clarifying, Problem-solving, Activity and Movement      Discipline Responsible: /Counselor      Signature:  Nicole Watkins, [facility]

## 2017-11-03 NOTE — PLAN OF CARE
Problem: Altered Mood, Depressive Behavior  Goal: LTG-Able to verbalize acceptance of life and situations over which he or she has no control                                                                      Group Therapy Note    Date: 11/3/2017  Start Time: 10:00  End Time:  10:45  Number of Participants: 8    Type of Group: Cognitive Skills    Wellness Binder Information  Module Name:  Mental health wellness   Session Number:  Tab 5    Patient's Goal:  Pt will learn how the fight or flight response is triggered and ways to cope. Notes:  PT participated in group discussion, was able to relate to group topic, and remained engaged throughout the entire duration. Status After Intervention:  Unchanged    Participation Level:  Active Listener and Interactive    Participation Quality: Appropriate, Attentive, Sharing and Supportive      Speech:  normal      Thought Process/Content: Logical      Affective Functioning: Flat      Mood: dysphoric      Level of consciousness:  Alert, Oriented x4 and Attentive      Response to Learning: Able to verbalize current knowledge/experience, Able to verbalize/acknowledge new learning and Progressing to goal      Endings: None Reported    Modes of Intervention: Education, Support, Socialization, Exploration, Clarifying and Problem-solving      Discipline Responsible: /Counselor      Signature:  Alan Cardenas

## 2017-11-03 NOTE — BH NOTE
Patient indicated that she had thoughts of self-harm on her check-in sheet . She denies SI stating that she had a bad class yesterday. She states that she was able to resolve her issues from the class with one therapist and states that she will talk to the other therapist on Tuesday. She states that the Grief group today got to her and she was feeling anxious but she again denies SI. She states that she will be in on Tuesday.

## 2017-11-07 ENCOUNTER — HOSPITAL ENCOUNTER (OUTPATIENT)
Dept: PSYCHIATRY | Age: 22
Discharge: HOME | End: 2017-11-07
Admitting: PSYCHIATRY & NEUROLOGY

## 2017-11-07 NOTE — PLAN OF CARE
Problem: Altered Mood, Depressive Behavior  Goal: LTG-Able to verbalize acceptance of life and situations over which he or she has no control                                                                      Group Therapy Note    Date: 11/7/2017  Start Time: 8:30  End Time:  9:45  Number of Participants: 6    Type of Group: Psychotherapy    Patient's Goal:  Pt will improve interpersonal interactions through group processing and agenda setting. Notes:  Pt participated in group discussion, provided supportive feedback toward others, and addressed her agenda within the group. Status After Intervention:  Unchanged    Participation Level:  Active Listener and Interactive    Participation Quality: Appropriate, Attentive, Sharing and Supportive      Speech:  normal      Thought Process/Content: Logical      Affective Functioning: Congruent      Mood: euthymic      Level of consciousness:  Drowsy      Response to Learning: Able to verbalize current knowledge/experience and Progressing to goal      Endings: None Reported    Modes of Intervention: Education, Support, Socialization, Exploration, Clarifying and Problem-solving      Discipline Responsible: /Counselor      Signature:  Yuri Salinas

## 2017-11-09 ENCOUNTER — HOSPITAL ENCOUNTER (OUTPATIENT)
Dept: PSYCHIATRY | Age: 22
Discharge: HOME | End: 2017-11-09
Admitting: PSYCHIATRY & NEUROLOGY

## 2017-11-09 PROCEDURE — 99214 OFFICE O/P EST MOD 30 MIN: CPT | Performed by: PHYSICIAN ASSISTANT

## 2017-11-09 RX ORDER — SERTRALINE HYDROCHLORIDE 100 MG/1
150 TABLET, FILM COATED ORAL DAILY
Qty: 45 TABLET | Refills: 1 | Status: SHIPPED | OUTPATIENT
Start: 2017-11-09 | End: 2018-07-08

## 2017-11-09 RX ORDER — BUSPIRONE HYDROCHLORIDE 10 MG/1
10 TABLET ORAL 3 TIMES DAILY
Qty: 90 TABLET | Refills: 1 | Status: SHIPPED | OUTPATIENT
Start: 2017-11-09 | End: 2017-12-09

## 2017-11-09 NOTE — PLAN OF CARE
Problem: Altered Mood, Depressive Behavior  Goal: STG-Knowledge of positive coping patterns  Outcome: Ongoing                                                                      Group Therapy Note    Date: 11/9/2017  Start Time: 11:15am  End Time: 12:15pm  Number of Participants: 6    Type of Group: Psychoeducation    Psychoeducation/ Recreation Therapy     Patient's Goal: Pts were directed to engage in an expressive writing activity of writing a letter to their future self 6 months from today. Notes: Pt did not engage in today's session. Pt appeared to be writing the letter. However, pt stated that she did not want to share her letter.      Status After Intervention:  Unchanged    Participation Level: None    Participation Quality: Resistant      Speech:  normal      Thought Process/Content: Linear      Affective Functioning: Flat      Mood: depressed      Level of consciousness:  Alert and Oriented x4      Response to Learning: Able to retain information and Capable of insight      Endings: None Reported    Modes of Intervention: Education, Support, Socialization and Activity      Discipline Responsible: Psychoeducational Specialist      Signature:  Kandice Kenyon
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
## 2017-11-10 ENCOUNTER — HOSPITAL ENCOUNTER (OUTPATIENT)
Dept: PSYCHIATRY | Age: 22
Discharge: HOME | End: 2017-11-10
Admitting: PSYCHIATRY & NEUROLOGY
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
## 2017-12-01 ENCOUNTER — HOSPITAL ENCOUNTER (OUTPATIENT)
Dept: PSYCHIATRY | Age: 22
Discharge: HOME | End: 2017-12-31
Attending: PSYCHIATRY & NEUROLOGY | Admitting: PSYCHIATRY & NEUROLOGY

## 2021-07-15 ENCOUNTER — CLINICAL DOCUMENTATION (OUTPATIENT)
Dept: OTHER | Age: 26
End: 2021-07-15